# Patient Record
Sex: FEMALE | Race: WHITE | NOT HISPANIC OR LATINO
[De-identification: names, ages, dates, MRNs, and addresses within clinical notes are randomized per-mention and may not be internally consistent; named-entity substitution may affect disease eponyms.]

---

## 2024-08-13 ENCOUNTER — DASHBOARD ENCOUNTERS (OUTPATIENT)
Age: 19
End: 2024-08-13

## 2024-08-13 ENCOUNTER — OFFICE VISIT (OUTPATIENT)
Dept: URBAN - METROPOLITAN AREA CLINIC 2 | Facility: CLINIC | Age: 19
End: 2024-08-13
Payer: COMMERCIAL

## 2024-08-13 ENCOUNTER — LAB OUTSIDE AN ENCOUNTER (OUTPATIENT)
Dept: URBAN - METROPOLITAN AREA CLINIC 2 | Facility: CLINIC | Age: 19
End: 2024-08-13

## 2024-08-13 VITALS
HEIGHT: 66 IN | DIASTOLIC BLOOD PRESSURE: 85 MMHG | SYSTOLIC BLOOD PRESSURE: 120 MMHG | BODY MASS INDEX: 27.87 KG/M2 | WEIGHT: 173.4 LBS | TEMPERATURE: 98.2 F | HEART RATE: 110 BPM

## 2024-08-13 DIAGNOSIS — R11.0 NAUSEA: ICD-10-CM

## 2024-08-13 DIAGNOSIS — K90.0 CELIAC DISEASE: ICD-10-CM

## 2024-08-13 DIAGNOSIS — R14.0 BLOATING: ICD-10-CM

## 2024-08-13 PROBLEM — 396331005: Status: ACTIVE | Noted: 2024-08-13

## 2024-08-13 PROCEDURE — 99204 OFFICE O/P NEW MOD 45 MIN: CPT | Performed by: NURSE PRACTITIONER

## 2024-08-13 RX ORDER — OMEPRAZOLE 40 MG/1
1 CAPSULE 30 MINUTES BEFORE MORNING MEAL CAPSULE, DELAYED RELEASE ORAL ONCE A DAY
Qty: 30 | OUTPATIENT
Start: 2024-08-13

## 2024-08-13 NOTE — HPI-TODAY'S VISIT:
Very pleasant 19-year-old female seen today for complaints of bloating symptoms began about 1 month ago.  She does have indigestion and abdominal tenderness with bloating and nausea.  She was diagnosed with celiac by serology last summer.  She has been eating gluten free since October 2023.  She denies constipation or diarrhea.  She does not take excess NSAIDs.  She rarely drinks alcohol.

## 2024-09-10 ENCOUNTER — OFFICE VISIT (OUTPATIENT)
Dept: URBAN - METROPOLITAN AREA SURGERY CENTER 19 | Facility: SURGERY CENTER | Age: 19
End: 2024-09-10

## 2024-09-17 ENCOUNTER — TELEPHONE ENCOUNTER (OUTPATIENT)
Dept: URBAN - METROPOLITAN AREA CLINIC 79 | Facility: CLINIC | Age: 19
End: 2024-09-17

## 2024-09-17 RX ORDER — OMEPRAZOLE 40 MG/1
1 CAPSULE 30 MINUTES BEFORE MORNING MEAL CAPSULE, DELAYED RELEASE ORAL ONCE A DAY
Qty: 30
Start: 2024-08-13

## 2024-09-30 ENCOUNTER — OFFICE VISIT (OUTPATIENT)
Dept: URBAN - METROPOLITAN AREA CLINIC 2 | Facility: CLINIC | Age: 19
End: 2024-09-30